# Patient Record
Sex: MALE | Race: WHITE | NOT HISPANIC OR LATINO
[De-identification: names, ages, dates, MRNs, and addresses within clinical notes are randomized per-mention and may not be internally consistent; named-entity substitution may affect disease eponyms.]

---

## 2018-04-14 ENCOUNTER — TRANSCRIPTION ENCOUNTER (OUTPATIENT)
Age: 68
End: 2018-04-14

## 2018-04-23 ENCOUNTER — TRANSCRIPTION ENCOUNTER (OUTPATIENT)
Age: 68
End: 2018-04-23

## 2019-09-16 ENCOUNTER — TRANSCRIPTION ENCOUNTER (OUTPATIENT)
Age: 69
End: 2019-09-16

## 2021-12-06 PROBLEM — Z00.00 ENCOUNTER FOR PREVENTIVE HEALTH EXAMINATION: Status: ACTIVE | Noted: 2021-12-06

## 2022-10-24 ENCOUNTER — APPOINTMENT (OUTPATIENT)
Dept: ORTHOPEDIC SURGERY | Facility: CLINIC | Age: 72
End: 2022-10-24

## 2022-10-24 VITALS — BODY MASS INDEX: 27.35 KG/M2 | WEIGHT: 220 LBS | HEIGHT: 75 IN

## 2022-10-24 DIAGNOSIS — Z85.46 PERSONAL HISTORY OF MALIGNANT NEOPLASM OF PROSTATE: ICD-10-CM

## 2022-10-24 DIAGNOSIS — E11.9 TYPE 2 DIABETES MELLITUS W/OUT COMPLICATIONS: ICD-10-CM

## 2022-10-24 PROCEDURE — 73660 X-RAY EXAM OF TOE(S): CPT | Mod: RT

## 2022-10-24 PROCEDURE — L3908: CPT

## 2022-10-24 PROCEDURE — 99203 OFFICE O/P NEW LOW 30 MIN: CPT

## 2022-10-24 PROCEDURE — L1820: CPT | Mod: KX,RT

## 2022-10-24 PROCEDURE — 73110 X-RAY EXAM OF WRIST: CPT | Mod: RT

## 2022-10-24 PROCEDURE — 73562 X-RAY EXAM OF KNEE 3: CPT | Mod: RT

## 2022-10-24 NOTE — PHYSICAL EXAM
DISPLAY PLAN FREE TEXT [Dorsal Wrist] : dorsal wrist [FreeTextEntry8] : barby of bone dorsal wrist [TWNoteComboBox4] : volarflexion 70 degrees [de-identified] : radial deviation 20 degrees [TWNoteComboBox9] : ulnar deviation 20 degrees [NL (0)] : extension 0 degrees [TWNoteComboBox7] : flexion 110 degrees [1st] : 1st [NL (20)] : dorsiflexion 20 degrees [NL (40)] : plantar flexion 40 degrees [5___] : plantar flexion 5[unfilled]/5 [2+] : posterior tibialis pulse: 2+ [] : patient ambulates without assistive device [Right] : right toe [There are no fractures, subluxations or dislocations. No significant abnormalities are seen] : There are no fractures, subluxations or dislocations. No significant abnormalities are seen

## 2022-10-24 NOTE — HISTORY OF PRESENT ILLNESS
[10] : 10 [0] : 0 [Dull/Aching] : dull/aching [Localized] : localized [Intermittent] : intermittent [Standing] : standing [Walking] : walking [Stairs] : stairs [Retired] : Work status: retired [de-identified] : 10/24/22: Patient is a 71 yo male c/o right knee, right wrist and left big toe pain for 1 day after he fell taking out the garbage. No n/t. Not taking any medication . Pain is worse with standing. No previous injuries or surgeries to right knee, right wrist or toe.  [] : Post Surgical Visit: no [FreeTextEntry1] : Rt wrist/knee/toe [FreeTextEntry5] : Patient went to take out the garbage last night on 10/23/22, slipped and fell down and injured his right wrist/knee/toe

## 2022-10-24 NOTE — ASSESSMENT
[FreeTextEntry1] : Xrays reviewed with patient\par Treatment options discussed \par HKB and wrist brace medically necessary for stability\par Toe buddy taped for stability, recommend hard sole shoe at home\par ice and otc anti-inflammatories/tylenol as needed\par Follow up with Dr. Nicole in 1 week for recheck\par

## 2022-10-31 ENCOUNTER — APPOINTMENT (OUTPATIENT)
Dept: ORTHOPEDIC SURGERY | Facility: CLINIC | Age: 72
End: 2022-10-31

## 2022-10-31 VITALS — HEIGHT: 75 IN | BODY MASS INDEX: 27.35 KG/M2 | WEIGHT: 220 LBS

## 2022-10-31 DIAGNOSIS — M17.11 UNILATERAL PRIMARY OSTEOARTHRITIS, RIGHT KNEE: ICD-10-CM

## 2022-10-31 DIAGNOSIS — S63.501A UNSPECIFIED SPRAIN OF RIGHT WRIST, INITIAL ENCOUNTER: ICD-10-CM

## 2022-10-31 DIAGNOSIS — S90.111A CONTUSION OF RIGHT GREAT TOE W/OUT DAMAGE TO NAIL, INITIAL ENCOUNTER: ICD-10-CM

## 2022-10-31 PROCEDURE — 99213 OFFICE O/P EST LOW 20 MIN: CPT

## 2022-10-31 NOTE — ASSESSMENT
[FreeTextEntry1] : May start warm soaks, ROM, desensitization with massage right wrist, d/c cock up splint. ROM and quad exercises for the knee, d/c brace

## 2022-10-31 NOTE — PHYSICAL EXAM
[Dorsal Wrist] : dorsal wrist [NL (0)] : extension 0 degrees [Negative] : negative Amara's [1st] : 1st [NL (20)] : dorsiflexion 20 degrees [NL (40)] : plantar flexion 40 degrees [5___] : plantar flexion 5[unfilled]/5 [2+] : posterior tibialis pulse: 2+ [Right] : right toe [There are no fractures, subluxations or dislocations. No significant abnormalities are seen] : There are no fractures, subluxations or dislocations. No significant abnormalities are seen [de-identified] : improved [FreeTextEntry8] : barby of bone dorsal wrist [TWNoteComboBox4] : volarflexion 70 degrees [de-identified] : radial deviation 20 degrees [TWNoteComboBox9] : ulnar deviation 20 degrees [TWNoteComboBox7] : flexion 125 degrees [] : not able to perform single heel raise

## 2022-10-31 NOTE — HISTORY OF PRESENT ILLNESS
[0] : 0 [Dull/Aching] : dull/aching [Localized] : localized [Intermittent] : intermittent [Standing] : standing [Walking] : walking [Stairs] : stairs [Retired] : Work status: retired [10] : 10 [de-identified] : 10/24/22: Patient is a 71 yo male c/o right knee, right wrist and left big toe pain for 1 day after he fell taking out the garbage. No n/t. Not taking any medication . Pain is worse with standing. No previous injuries or surgeries to right knee, right wrist or toe.  [] : Post Surgical Visit: no [FreeTextEntry1] : Rt wrist/knee/toe [FreeTextEntry5] : Patient went to take out the garbage last night on 10/23/22, slipped and fell down and injured his right wrist/knee/toe

## 2022-11-28 ENCOUNTER — APPOINTMENT (OUTPATIENT)
Dept: ORTHOPEDIC SURGERY | Facility: CLINIC | Age: 72
End: 2022-11-28

## 2023-10-13 ENCOUNTER — NON-APPOINTMENT (OUTPATIENT)
Age: 73
End: 2023-10-13

## 2024-09-01 ENCOUNTER — EMERGENCY (EMERGENCY)
Facility: HOSPITAL | Age: 74
LOS: 0 days | Discharge: HOME | End: 2024-09-01
Payer: MEDICARE

## 2024-09-01 VITALS
OXYGEN SATURATION: 96 % | SYSTOLIC BLOOD PRESSURE: 155 MMHG | HEIGHT: 75 IN | RESPIRATION RATE: 20 BRPM | WEIGHT: 220.02 LBS | TEMPERATURE: 98 F | HEART RATE: 61 BPM | DIASTOLIC BLOOD PRESSURE: 77 MMHG

## 2024-09-01 VITALS — TEMPERATURE: 98 F | DIASTOLIC BLOOD PRESSURE: 85 MMHG | SYSTOLIC BLOOD PRESSURE: 170 MMHG

## 2024-09-01 DIAGNOSIS — Z85.46 PERSONAL HISTORY OF MALIGNANT NEOPLASM OF PROSTATE: ICD-10-CM

## 2024-09-01 DIAGNOSIS — M79.661 PAIN IN RIGHT LOWER LEG: ICD-10-CM

## 2024-09-01 DIAGNOSIS — Z90.79 ACQUIRED ABSENCE OF OTHER GENITAL ORGAN(S): ICD-10-CM

## 2024-09-01 DIAGNOSIS — E11.9 TYPE 2 DIABETES MELLITUS WITHOUT COMPLICATIONS: ICD-10-CM

## 2024-09-01 DIAGNOSIS — E78.5 HYPERLIPIDEMIA, UNSPECIFIED: ICD-10-CM

## 2024-09-01 DIAGNOSIS — Z79.84 LONG TERM (CURRENT) USE OF ORAL HYPOGLYCEMIC DRUGS: ICD-10-CM

## 2024-09-01 PROCEDURE — 73590 X-RAY EXAM OF LOWER LEG: CPT | Mod: 26,RT

## 2024-09-01 PROCEDURE — 99284 EMERGENCY DEPT VISIT MOD MDM: CPT

## 2024-09-01 PROCEDURE — 93971 EXTREMITY STUDY: CPT | Mod: 26,RT

## 2024-09-01 RX ORDER — ACETAMINOPHEN 325 MG/1
975 TABLET ORAL ONCE
Refills: 0 | Status: COMPLETED | OUTPATIENT
Start: 2024-09-01 | End: 2024-09-01

## 2024-09-01 RX ORDER — LIDOCAINE/BENZALKONIUM/ALCOHOL
1 SOLUTION, NON-ORAL TOPICAL ONCE
Refills: 0 | Status: COMPLETED | OUTPATIENT
Start: 2024-09-01 | End: 2024-09-01

## 2024-09-01 RX ORDER — IBUPROFEN 600 MG
600 TABLET ORAL ONCE
Refills: 0 | Status: COMPLETED | OUTPATIENT
Start: 2024-09-01 | End: 2024-09-01

## 2024-09-01 RX ADMIN — Medication 600 MILLIGRAM(S): at 17:12

## 2024-09-01 RX ADMIN — ACETAMINOPHEN 975 MILLIGRAM(S): 325 TABLET ORAL at 21:30

## 2024-09-01 RX ADMIN — Medication 1 PATCH: at 21:31

## 2024-09-01 NOTE — ED PROVIDER NOTE - CARE PROVIDER_API CALL
your pmd in 1-3 days,   Phone: (   )    -  Fax: (   )    -  Follow Up Time:     Mario Gomez  Orthopaedic Surgery  [address] [place] [id number]  Phone: [phone]  Fax: [phone]  Follow Up Time: 1-3 Days

## 2024-09-01 NOTE — ED ADULT TRIAGE NOTE - CHIEF COMPLAINT QUOTE
cramps to right leg  calf area x3 weeks ago, last night pain worsen with difficulty walking.  H/O  DM, HTN,HDL

## 2024-09-01 NOTE — ED ADULT NURSE NOTE - NSFALLUNIVINTERV_ED_ALL_ED
Bed/Stretcher in lowest position, wheels locked, appropriate side rails in place/Call bell, personal items and telephone in reach/Instruct patient to call for assistance before getting out of bed/chair/stretcher/Non-slip footwear applied when patient is off stretcher/[place] to call system/Physically safe environment - no spills, clutter or unnecessary equipment/Purposeful proactive rounding/Room/bathroom lighting operational, light cord in reach

## 2024-09-01 NOTE — ED PROVIDER NOTE - PHYSICAL EXAMINATION
PHYSICAL EXAM:    GENERAL: Alert, appears stated age, well appearing, non-toxic  SKIN: Warm,and dry. MMM.   HEAD: NC, AT  EYE: Normal lids/conjunctiva  ENT: Normal hearing, patent oropharynx  NECK: +supple. No meningismus  Pulm: Bilateral BS, normal resp effort, no wheezes, stridor, or retractions  CV: RRR, no M/R/G, 2+and = radial pulses  Abd: soft, non-tender, non-distended  Mskel: no erythema, cyanosis, edema.+ R posterior mid tib/fib ttp. 2+ dp/pt pulses. no rash  Neuro: AAOx3, no sensory/motor deficits,. 5/5 strength throughout. normal gait

## 2024-09-01 NOTE — ED PROVIDER NOTE - PROVIDER TOKENS
FREE:[LAST:[your pmd in 1-3 days],PHONE:[(   )    -],FAX:[(   )    -]],PROVIDER:[TOKEN:[[id number]],FOLLOWUP:[1-3 Days]]

## 2024-09-01 NOTE — ED PROVIDER NOTE - CLINICAL SUMMARY MEDICAL DECISION MAKING FREE TEXT BOX
74-year-old male with PMH diabetes on glipizide/Jardiance/metformin, hyperlipidemia, remote history of prostate cancer s/p prostatectomy 12 years ago presents with mild constant right calf pain x 3 wks.   Worse with movement/pinpoint pressure, better with elevation.  No rash, chest pain, shortness of breath, nausea, vomiting, trauma, fall, weakness, color change, paresthesia.  Denies hemoptysis, recent surgery/immobilization, hx cancers, hx PE/DVT,  hormone use, calf swelling.   exam with mild R posterior mid/tib fib TTP 2+ dp pulses.   5/5 strength   ddx: Fracture, dislocation, bony abnormality, DVT.  X-ray and venous duplex negative.  Patient to follow-up with PMD/Ortho.

## 2024-09-01 NOTE — ED PROVIDER NOTE - PATIENT PORTAL LINK FT
You can access the FollowMyHealth Patient Portal offered by [facility] by registering at the following website: [email or web address]. By joining [employer]’s FollowMyHealth portal, you will also be able to view your health information using other applications (apps) compatible with our system.

## 2024-09-01 NOTE — ED ADULT TRIAGE NOTE - AS PAIN REST
BG control significantly improved with A1C 6.2; remains on inokana 300mg QD and met XR 4 QD  
BP stable but with hypercalcemia, will discontinue triam HCTZ; follow BPs and consider ACEI; f/u in 3 mos at the latest  
Bord Ca levels 10.4-10.7 over the last year; nl PTH; bord ionized Ca; discussed with Dr Linder for recs; d/c HCTZ and f/u level (BMP, ionized Ca, PTH-related peptide, SPEP, UPEP) in 1 month  
Health maintenance - flu vacc 10/16; PVX 9/16, plan for Prevnar at age 64; Tdap 7/11, Zostavax 11/13; CASSANDRA performed today with stable PSA 1.1 (12/16); nl colonosc 6/15, repeat 2020 per Dr. Brandon; eye exam 10/16 at [facility] [place], done annually; dental exam [facility], done every 6 mos  
Stable, no current supplementation  
TGs remain elevated with vascepa and fenofibrate; LDL at goal (66); rec trying very strict no fat/sugars diet as well as exercise to help with weight loss; f/u lipids in 3 mos  
8 (severe pain)

## 2024-09-01 NOTE — ED ADULT NURSE NOTE - OBJECTIVE STATEMENT
cramps to right leg  calf area x3 weeks ago, last night pain worsen with difficulty walking. Patient denies any SOB, ALONSO, no redness or edema noted.

## 2024-09-03 ENCOUNTER — APPOINTMENT (OUTPATIENT)
Dept: ORTHOPEDIC SURGERY | Facility: CLINIC | Age: 74
End: 2024-09-03
Payer: MEDICARE

## 2024-09-03 DIAGNOSIS — M76.891 OTHER SPECIFIED ENTHESOPATHIES OF RIGHT LOWER LIMB, EXCLUDING FOOT: ICD-10-CM

## 2024-09-03 DIAGNOSIS — S86.111A STRAIN OF OTHER MUSCLE(S) AND TENDON(S) OF POSTERIOR MUSCLE GROUP AT LOWER LEG LEVEL, RIGHT LEG, INITIAL ENCOUNTER: ICD-10-CM

## 2024-09-03 PROCEDURE — 73552 X-RAY EXAM OF FEMUR 2/>: CPT | Mod: RT

## 2024-09-03 PROCEDURE — 72170 X-RAY EXAM OF PELVIS: CPT

## 2024-09-03 PROCEDURE — 99214 OFFICE O/P EST MOD 30 MIN: CPT

## 2024-09-03 RX ORDER — METHYLPREDNISOLONE 4 MG/1
4 TABLET ORAL
Qty: 1 | Refills: 1 | Status: ACTIVE | COMMUNITY
Start: 2024-09-03 | End: 1900-01-01

## 2024-09-03 NOTE — ASSESSMENT
[FreeTextEntry1] : acute onset of right lower leg pain since mid august 2024.  no injury.   doppler done at  was negative for DVT.   xrays from  unremarkable as well.  ttp gastroc muscle belly.   now having right hip and right upper leg pain.  mostly over bursa and IT band.    possible nerve irritation from l spine.  dm last bs was 134 htn, high cholesterol

## 2024-09-03 NOTE — DATA REVIEWED
[Venous Doppler] : A Venous Doppler test was completed of the [Right] : right [Lower extremity] : lower extremity [Negative] : negative

## 2024-09-03 NOTE — DISCUSSION/SUMMARY
[de-identified] : The patient's orthopaedic condition(s) warrants use of a medrol dose pack.  This medication is associated with risks including but not limited to gastrointestinal irritation, elevated blood sugar levels, restlessness, hypertension and other problems. The patient understands and will take the medication as prescribed.  The patient will stop the medication and consult a physician as needed if problems arise.  Watch sugars. patient aware. hep and PT. follow up 2 weeks if pain persists.

## 2024-09-03 NOTE — PHYSICAL EXAM
[2+] : posterior tibialis pulse: 2+ [Right] : right foot and ankle [4___] : plantar flexion 4[unfilled]/5 [] : no swelling [FreeTextEntry8] : ttp gastroc muscle belly today

## 2024-09-03 NOTE — IMAGING
[Outside films reviewed] : Outside films reviewed [Right] : right hip [There are no fractures, subluxations or dislocations. No significant abnormalities are seen] : There are no fractures, subluxations or dislocations. No significant abnormalities are seen [FreeTextEntry9] : femur xrays negative.

## 2024-09-03 NOTE — HISTORY OF PRESENT ILLNESS
[Left Leg] : left leg [Gradual] : gradual [Sudden] : sudden [6] : 6 [Stabbing] : stabbing [Intermittent] : intermittent [Rest] : rest [Exercising] : exercising [de-identified] : 9/3/24:  acute onset of right lower leg pain since mid august 2024.  no injury.  now having right hip and right upper leg pain.  no lower back pain.  no shooting pains.  no numbness or tingling.   [] : This patient has had an injection before: no [FreeTextEntry1] : left calf  [FreeTextEntry5] : Pt has had a gradual onset of left calf pain for the lats few weeks but recently went to go get up from his bed and felt a sudden onset of pain to the left calf, pt went to the hospital and had xrays and was told to follow up with us  [de-identified] : xrays doppler  [de-identified] : none

## 2024-09-24 ENCOUNTER — APPOINTMENT (OUTPATIENT)
Dept: ORTHOPEDIC SURGERY | Facility: CLINIC | Age: 74
End: 2024-09-24
Payer: MEDICARE

## 2024-09-24 DIAGNOSIS — S86.111A STRAIN OF OTHER MUSCLE(S) AND TENDON(S) OF POSTERIOR MUSCLE GROUP AT LOWER LEG LEVEL, RIGHT LEG, INITIAL ENCOUNTER: ICD-10-CM

## 2024-09-24 PROCEDURE — 99214 OFFICE O/P EST MOD 30 MIN: CPT

## 2024-09-24 RX ORDER — MELOXICAM 15 MG/1
15 TABLET ORAL
Qty: 30 | Refills: 2 | Status: ACTIVE | COMMUNITY
Start: 2024-09-24 | End: 1900-01-01

## 2024-09-24 NOTE — ASSESSMENT
[FreeTextEntry1] : acute onset of right lower leg pain since mid august 2024.  no injury.   doppler done at  was negative for DVT.   xrays from  unremarkable as well.  ttp gastroc muscle belly.   improved with mdp..   now having right hip and right upper leg pain.  mostly over bursa and IT band.    possible nerve irritation from l spine.  improved with mdp  dm last bs was 134 htn, high cholesterol

## 2024-09-24 NOTE — DISCUSSION/SUMMARY
[de-identified] : Hep and PT.  Mobic. The patient's orthopaedic condition(s) warrants intermittent use of a prescription strength non-steroidal anti-inflammatory medication.  These medications are associated with risks including but not limited to gastrointestinal irritation, kidney damage, hypertension, and bleeding.  The patient understands and will take medications as prescribed.  The patient will stop the medication and consult a physician as needed if problems arise.  Follow up 4 weeks.

## 2024-09-24 NOTE — PHYSICAL EXAM
[2+] : posterior tibialis pulse: 2+ [Right] : right foot and ankle [4___] : plantar flexion 4[unfilled]/5 [3___] : adduction 3[unfilled]/5 [] : no swelling [FreeTextEntry8] : ttp gastroc muscle belly today

## 2024-09-24 NOTE — HISTORY OF PRESENT ILLNESS
[Left Leg] : left leg [Gradual] : gradual [Sudden] : sudden [6] : 6 [Stabbing] : stabbing [Intermittent] : intermittent [Rest] : rest [Exercising] : exercising [de-identified] : 9/3/24:  acute onset of right lower leg pain since mid august 2024.  no injury.  now having right hip and right upper leg pain.  no lower back pain.  no shooting pains.  no numbness or tingling.   09/24/24 - states comes and goes, attended pt yesterday/ pain began after. states some pain this morning. no numbness or tingling.  [] : This patient has had an injection before: no [FreeTextEntry1] : left calf  [de-identified] : xrays doppler  [FreeTextEntry5] : Pt has had a gradual onset of left calf pain for the lats few weeks but recently went to go get up from his bed and felt a sudden onset of pain to the left calf, pt went to the hospital and had xrays and was told to follow up with us  [de-identified] : none

## 2024-10-22 ENCOUNTER — APPOINTMENT (OUTPATIENT)
Dept: ORTHOPEDIC SURGERY | Facility: CLINIC | Age: 74
End: 2024-10-22

## 2025-02-13 ENCOUNTER — APPOINTMENT (OUTPATIENT)
Dept: ORTHOPEDIC SURGERY | Facility: CLINIC | Age: 75
End: 2025-02-13
Payer: MEDICARE

## 2025-02-13 VITALS — BODY MASS INDEX: 27.35 KG/M2 | WEIGHT: 220 LBS | HEIGHT: 75 IN

## 2025-02-13 DIAGNOSIS — M65.331 TRIGGER FINGER, RIGHT MIDDLE FINGER: ICD-10-CM

## 2025-02-13 DIAGNOSIS — M65.352 TRIGGER FINGER, LEFT LITTLE FINGER: ICD-10-CM

## 2025-02-13 DIAGNOSIS — G56.02 CARPAL TUNNEL SYNDROME, LEFT UPPER LIMB: ICD-10-CM

## 2025-02-13 DIAGNOSIS — M65.342 TRIGGER FINGER, LEFT RING FINGER: ICD-10-CM

## 2025-02-13 DIAGNOSIS — G56.01 CARPAL TUNNEL SYNDROME, RIGHT UPPER LIMB: ICD-10-CM

## 2025-02-13 DIAGNOSIS — M65.341 TRIGGER FINGER, RIGHT RING FINGER: ICD-10-CM

## 2025-02-13 DIAGNOSIS — M65.332 TRIGGER FINGER, LEFT MIDDLE FINGER: ICD-10-CM

## 2025-02-13 PROCEDURE — 99203 OFFICE O/P NEW LOW 30 MIN: CPT

## 2025-03-13 ENCOUNTER — APPOINTMENT (OUTPATIENT)
Dept: ORTHOPEDIC SURGERY | Facility: CLINIC | Age: 75
End: 2025-03-13
Payer: MEDICARE

## 2025-03-13 VITALS
BODY MASS INDEX: 27.35 KG/M2 | DIASTOLIC BLOOD PRESSURE: 72 MMHG | HEART RATE: 74 BPM | HEIGHT: 75 IN | WEIGHT: 220 LBS | SYSTOLIC BLOOD PRESSURE: 128 MMHG

## 2025-03-13 DIAGNOSIS — G56.02 CARPAL TUNNEL SYNDROME, LEFT UPPER LIMB: ICD-10-CM

## 2025-03-13 DIAGNOSIS — Z87.891 PERSONAL HISTORY OF NICOTINE DEPENDENCE: ICD-10-CM

## 2025-03-13 DIAGNOSIS — M65.332 TRIGGER FINGER, LEFT MIDDLE FINGER: ICD-10-CM

## 2025-03-13 DIAGNOSIS — M65.341 TRIGGER FINGER, RIGHT RING FINGER: ICD-10-CM

## 2025-03-13 DIAGNOSIS — G56.01 CARPAL TUNNEL SYNDROME, RIGHT UPPER LIMB: ICD-10-CM

## 2025-03-13 DIAGNOSIS — M65.331 TRIGGER FINGER, RIGHT MIDDLE FINGER: ICD-10-CM

## 2025-03-13 DIAGNOSIS — I70.208 UNSPECIFIED ATHEROSCLEROSIS OF NATIVE ARTERIES OF EXTREMITIES, OTHER EXTREMITY: ICD-10-CM

## 2025-03-13 DIAGNOSIS — M65.342 TRIGGER FINGER, LEFT RING FINGER: ICD-10-CM

## 2025-03-13 PROCEDURE — 99215 OFFICE O/P EST HI 40 MIN: CPT

## 2025-03-13 RX ORDER — LOSARTAN POTASSIUM 50 MG/1
50 TABLET, FILM COATED ORAL
Refills: 0 | Status: ACTIVE | COMMUNITY

## 2025-03-13 RX ORDER — GLIPIZIDE 5 MG/1
5 TABLET ORAL
Refills: 0 | Status: ACTIVE | COMMUNITY

## 2025-03-13 RX ORDER — ATORVASTATIN CALCIUM 80 MG/1
TABLET, FILM COATED ORAL
Refills: 0 | Status: ACTIVE | COMMUNITY

## 2025-03-13 RX ORDER — SERTRALINE 25 MG/1
TABLET, FILM COATED ORAL
Refills: 0 | Status: ACTIVE | COMMUNITY

## 2025-03-13 RX ORDER — METFORMIN HYDROCHLORIDE 750 MG/1
TABLET ORAL
Refills: 0 | Status: ACTIVE | COMMUNITY

## 2025-03-13 RX ORDER — EMPAGLIFLOZIN 10 MG/1
10 TABLET, FILM COATED ORAL
Refills: 0 | Status: ACTIVE | COMMUNITY

## 2025-03-14 ENCOUNTER — APPOINTMENT (OUTPATIENT)
Dept: MRI IMAGING | Facility: CLINIC | Age: 75
End: 2025-03-14

## 2025-03-14 ENCOUNTER — OUTPATIENT (OUTPATIENT)
Dept: OUTPATIENT SERVICES | Facility: HOSPITAL | Age: 75
LOS: 1 days | End: 2025-03-14
Payer: MEDICARE

## 2025-03-14 DIAGNOSIS — I70.208 UNSPECIFIED ATHEROSCLEROSIS OF NATIVE ARTERIES OF EXTREMITIES, OTHER EXTREMITY: ICD-10-CM

## 2025-03-14 PROCEDURE — C8936: CPT

## 2025-03-14 PROCEDURE — 73225 MR ANGIO UPR EXTR W/O&W/DYE: CPT | Mod: 26,RT

## 2025-03-14 PROCEDURE — A9585: CPT

## 2025-03-17 ENCOUNTER — APPOINTMENT (OUTPATIENT)
Dept: ORTHOPEDIC SURGERY | Facility: CLINIC | Age: 75
End: 2025-03-17
Payer: MEDICARE

## 2025-03-17 ENCOUNTER — NON-APPOINTMENT (OUTPATIENT)
Age: 75
End: 2025-03-17

## 2025-03-17 PROBLEM — I99.8 ISCHEMIA OF HAND: Status: ACTIVE | Noted: 2025-03-17

## 2025-03-17 PROCEDURE — 99203 OFFICE O/P NEW LOW 30 MIN: CPT

## 2025-03-17 PROCEDURE — 99213 OFFICE O/P EST LOW 20 MIN: CPT

## 2025-03-17 RX ORDER — NIFEDIPINE 30 MG/1
30 TABLET, EXTENDED RELEASE ORAL DAILY
Qty: 30 | Refills: 0 | Status: ACTIVE | COMMUNITY
Start: 2025-03-17 | End: 1900-01-01

## 2025-03-24 ENCOUNTER — APPOINTMENT (OUTPATIENT)
Dept: ORTHOPEDIC SURGERY | Facility: CLINIC | Age: 75
End: 2025-03-24
Payer: MEDICARE

## 2025-03-24 DIAGNOSIS — I99.8 OTHER DISORDER OF CIRCULATORY SYSTEM: ICD-10-CM

## 2025-03-24 DIAGNOSIS — I70.208 UNSPECIFIED ATHEROSCLEROSIS OF NATIVE ARTERIES OF EXTREMITIES, OTHER EXTREMITY: ICD-10-CM

## 2025-03-24 PROCEDURE — 99212 OFFICE O/P EST SF 10 MIN: CPT

## 2025-04-09 RX ORDER — NIFEDIPINE 30 MG/1
30 TABLET, EXTENDED RELEASE ORAL DAILY
Qty: 30 | Refills: 3 | Status: ACTIVE | COMMUNITY
Start: 2025-04-09 | End: 1900-01-01

## 2025-04-21 ENCOUNTER — APPOINTMENT (OUTPATIENT)
Dept: ORTHOPEDIC SURGERY | Facility: CLINIC | Age: 75
End: 2025-04-21

## 2025-04-21 DIAGNOSIS — M65.342 TRIGGER FINGER, LEFT RING FINGER: ICD-10-CM

## 2025-04-21 PROCEDURE — 20550 NJX 1 TENDON SHEATH/LIGAMENT: CPT | Mod: LT

## 2025-04-21 PROCEDURE — 99213 OFFICE O/P EST LOW 20 MIN: CPT | Mod: 25
